# Patient Record
Sex: MALE | Employment: FULL TIME | URBAN - METROPOLITAN AREA
[De-identification: names, ages, dates, MRNs, and addresses within clinical notes are randomized per-mention and may not be internally consistent; named-entity substitution may affect disease eponyms.]

---

## 2022-11-07 ENCOUNTER — OFFICE VISIT (OUTPATIENT)
Dept: OCCUPATIONAL MEDICINE | Age: 57
End: 2022-11-07

## 2022-11-07 DIAGNOSIS — M54.50 ACUTE LEFT-SIDED LOW BACK PAIN WITHOUT SCIATICA: Primary | ICD-10-CM

## 2022-11-07 RX ORDER — IBUPROFEN 800 MG/1
800 TABLET ORAL 2 TIMES DAILY PRN
Qty: 60 TABLET | Refills: 0 | Status: SHIPPED | OUTPATIENT
Start: 2022-11-07

## 2022-11-07 RX ORDER — CYCLOBENZAPRINE HCL 5 MG
5 TABLET ORAL NIGHTLY PRN
Qty: 30 TABLET | Refills: 0 | Status: SHIPPED | OUTPATIENT
Start: 2022-11-07 | End: 2022-11-17

## 2022-11-09 ASSESSMENT — ENCOUNTER SYMPTOMS
COUGH: 0
NAUSEA: 0
BACK PAIN: 1
SHORTNESS OF BREATH: 0
VOMITING: 0

## 2022-11-10 NOTE — PROGRESS NOTES
PROGRESS NOTE    SUBJECTIVE:   Cecilia Bailon is a 56 y.o. male seen for a follow up visit regarding Back Pain   The patient is a 56 y.o. male who is seen for evaluation of low back pain. Symptoms have been present for several days and include pain in lower back (aching, pressure, sharp, and throbbing in character; 6/10 in severity) and stiffness in lower back . Initial inciting event: lifting. Symptoms are worst: morning, mid-day, afternoon, evening. Exacerbating factors identifiable by patient are standing, bending forwards, bending backwards, bending sideways. Treatments so far initiated by patient:none. Previous workup: Rest  Ice  Heat. Past Medical History, Past Surgical History, Family history, Social History, and Medications were all reviewed with the patient today and updated as necessary. Current Outpatient Medications   Medication Sig Dispense Refill    ibuprofen (ADVIL;MOTRIN) 800 MG tablet Take 1 tablet by mouth 2 times daily as needed for Pain 60 tablet 0    cyclobenzaprine (FLEXERIL) 5 MG tablet Take 1 tablet by mouth nightly as needed for Muscle spasms 30 tablet 0    budesonide (RINOCORT AQUA) 32 MCG/ACT nasal spray 2 sprays by Nasal route daily      clindamycin (CLINDAGEL) 1 % gel Apply topically 2 times daily      cyclobenzaprine (FLEXERIL) 10 MG tablet Take 10 mg by mouth      doxycycline monohydrate (ADOXA) 100 MG tablet Take 100 mg by mouth 2 times daily      ibuprofen (ADVIL;MOTRIN) 800 MG tablet Take 800 mg by mouth every 8 hours as needed      loratadine-pseudoephedrine (CLARITIN-D 24 HOUR)  MG per extended release tablet Take 1 tablet by mouth daily      montelukast (SINGULAIR) 10 MG tablet Take 10 mg by mouth daily       No current facility-administered medications for this visit. Not on File  There is no problem list on file for this patient. No past medical history on file. No past surgical history on file. No family history on file.   Social History     Tobacco Use    Smoking status: Never    Smokeless tobacco: Never   Substance Use Topics    Alcohol use: Yes       Review of Systems   Constitutional:  Negative for chills, fatigue and fever. HENT:  Negative for congestion. Respiratory:  Negative for cough and shortness of breath. Cardiovascular:  Negative for chest pain and leg swelling. Gastrointestinal:  Negative for nausea and vomiting. Endocrine: Negative for cold intolerance, heat intolerance, polydipsia, polyphagia and polyuria. Musculoskeletal:  Positive for back pain. Negative for joint swelling, myalgias and neck pain. Neurological:  Negative for numbness. OBJECTIVE:  There were no vitals taken for this visit. Physical Exam  Constitutional:       Appearance: Normal appearance. Cardiovascular:      Rate and Rhythm: Normal rate and regular rhythm. Pulmonary:      Effort: Pulmonary effort is normal.      Breath sounds: Normal breath sounds. Musculoskeletal:      Lumbar back: Spasms, tenderness and bony tenderness present. Positive right straight leg raise test and positive left straight leg raise test.   Neurological:      Mental Status: He is alert. ASSESSMENT and PLAN    Susanne Briones was seen today for back pain. Diagnoses and all orders for this visit:    Acute left-sided low back pain without sciatica    Other orders  -     ibuprofen (ADVIL;MOTRIN) 800 MG tablet; Take 1 tablet by mouth 2 times daily as needed for Pain  -     cyclobenzaprine (FLEXERIL) 5 MG tablet; Take 1 tablet by mouth nightly as needed for Muscle spasms      Ice, stretches, take PRN meds. No improvement or wosrening RTC. RTC as needed.